# Patient Record
Sex: FEMALE | Race: WHITE | Employment: OTHER | URBAN - METROPOLITAN AREA
[De-identification: names, ages, dates, MRNs, and addresses within clinical notes are randomized per-mention and may not be internally consistent; named-entity substitution may affect disease eponyms.]

---

## 2017-01-01 ENCOUNTER — OFFICE VISIT (OUTPATIENT)
Dept: PULMONOLOGY | Facility: CLINIC | Age: 62
End: 2017-01-01
Attending: INTERNAL MEDICINE
Payer: MEDICARE

## 2017-01-01 ENCOUNTER — INFUSION THERAPY VISIT (OUTPATIENT)
Dept: INFUSION THERAPY | Facility: CLINIC | Age: 62
End: 2017-01-01
Attending: INTERNAL MEDICINE
Payer: MEDICARE

## 2017-01-01 ENCOUNTER — HOSPITAL ENCOUNTER (EMERGENCY)
Facility: CLINIC | Age: 62
Discharge: HOME | End: 2017-07-02
Attending: EMERGENCY MEDICINE | Admitting: EMERGENCY MEDICINE
Payer: MEDICARE

## 2017-01-01 ENCOUNTER — APPOINTMENT (OUTPATIENT)
Dept: GENERAL RADIOLOGY | Facility: CLINIC | Age: 62
End: 2017-01-01
Attending: EMERGENCY MEDICINE
Payer: MEDICARE

## 2017-01-01 ENCOUNTER — HOSPITAL ENCOUNTER (EMERGENCY)
Facility: CLINIC | Age: 62
Discharge: HOME | End: 2017-01-08
Attending: EMERGENCY MEDICINE | Admitting: EMERGENCY MEDICINE
Payer: MEDICARE

## 2017-01-01 ENCOUNTER — TELEPHONE (OUTPATIENT)
Dept: PULMONOLOGY | Facility: CLINIC | Age: 62
End: 2017-01-01

## 2017-01-01 ENCOUNTER — HOSPITAL ENCOUNTER (EMERGENCY)
Facility: CLINIC | Age: 62
Discharge: HOME | End: 2017-01-09
Attending: EMERGENCY MEDICINE | Admitting: EMERGENCY MEDICINE
Payer: MEDICARE

## 2017-01-01 VITALS
TEMPERATURE: 98.7 F | HEIGHT: 62 IN | DIASTOLIC BLOOD PRESSURE: 78 MMHG | RESPIRATION RATE: 18 BRPM | WEIGHT: 135 LBS | SYSTOLIC BLOOD PRESSURE: 147 MMHG | BODY MASS INDEX: 24.84 KG/M2 | HEART RATE: 77 BPM | OXYGEN SATURATION: 96 %

## 2017-01-01 VITALS
SYSTOLIC BLOOD PRESSURE: 150 MMHG | DIASTOLIC BLOOD PRESSURE: 74 MMHG | RESPIRATION RATE: 16 BRPM | HEART RATE: 70 BPM | TEMPERATURE: 98.3 F

## 2017-01-01 VITALS
BODY MASS INDEX: 24.8 KG/M2 | HEIGHT: 63 IN | WEIGHT: 140 LBS | TEMPERATURE: 97.4 F | DIASTOLIC BLOOD PRESSURE: 76 MMHG | OXYGEN SATURATION: 97 % | SYSTOLIC BLOOD PRESSURE: 154 MMHG

## 2017-01-01 VITALS
HEART RATE: 74 BPM | HEIGHT: 64 IN | BODY MASS INDEX: 23.9 KG/M2 | SYSTOLIC BLOOD PRESSURE: 133 MMHG | OXYGEN SATURATION: 94 % | TEMPERATURE: 98.3 F | WEIGHT: 140 LBS | RESPIRATION RATE: 20 BRPM | DIASTOLIC BLOOD PRESSURE: 96 MMHG

## 2017-01-01 VITALS
DIASTOLIC BLOOD PRESSURE: 62 MMHG | RESPIRATION RATE: 18 BRPM | TEMPERATURE: 98.2 F | HEART RATE: 68 BPM | SYSTOLIC BLOOD PRESSURE: 144 MMHG

## 2017-01-01 VITALS
SYSTOLIC BLOOD PRESSURE: 142 MMHG | HEART RATE: 74 BPM | DIASTOLIC BLOOD PRESSURE: 72 MMHG | TEMPERATURE: 98.2 F | RESPIRATION RATE: 14 BRPM

## 2017-01-01 VITALS
RESPIRATION RATE: 18 BRPM | HEART RATE: 81 BPM | SYSTOLIC BLOOD PRESSURE: 143 MMHG | DIASTOLIC BLOOD PRESSURE: 69 MMHG | TEMPERATURE: 98.6 F

## 2017-01-01 VITALS
RESPIRATION RATE: 16 BRPM | SYSTOLIC BLOOD PRESSURE: 122 MMHG | DIASTOLIC BLOOD PRESSURE: 60 MMHG | TEMPERATURE: 97.8 F | WEIGHT: 136 LBS | HEIGHT: 63 IN | HEART RATE: 69 BPM | OXYGEN SATURATION: 96 % | BODY MASS INDEX: 24.1 KG/M2

## 2017-01-01 VITALS
RESPIRATION RATE: 20 BRPM | SYSTOLIC BLOOD PRESSURE: 129 MMHG | HEART RATE: 67 BPM | DIASTOLIC BLOOD PRESSURE: 68 MMHG | TEMPERATURE: 97.5 F

## 2017-01-01 VITALS — DIASTOLIC BLOOD PRESSURE: 82 MMHG | RESPIRATION RATE: 16 BRPM | SYSTOLIC BLOOD PRESSURE: 150 MMHG | TEMPERATURE: 97.6 F

## 2017-01-01 DIAGNOSIS — J47.9 BRONCHIECTASIS WITHOUT COMPLICATION (H): ICD-10-CM

## 2017-01-01 DIAGNOSIS — R09.02 HYPOXEMIA: ICD-10-CM

## 2017-01-01 DIAGNOSIS — D80.1 HYPOGAMMAGLOBULINEMIA (H): Primary | ICD-10-CM

## 2017-01-01 DIAGNOSIS — G47.33 OSA (OBSTRUCTIVE SLEEP APNEA): ICD-10-CM
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
## 2017-01-01 DIAGNOSIS — S09.90XA CLOSED HEAD INJURY, INITIAL ENCOUNTER: ICD-10-CM

## 2017-01-01 DIAGNOSIS — J98.01 BRONCHOSPASM: ICD-10-CM

## 2017-01-01 DIAGNOSIS — J47.9 BRONCHIECTASIS WITHOUT ACUTE EXACERBATION (H): ICD-10-CM

## 2017-01-01 DIAGNOSIS — M62.81 GENERALIZED MUSCLE WEAKNESS: Primary | ICD-10-CM

## 2017-01-01 DIAGNOSIS — S01.01XA LACERATION OF SCALP, INITIAL ENCOUNTER: ICD-10-CM

## 2017-01-01 DIAGNOSIS — J47.9 BRONCHIECTASIS (H): ICD-10-CM

## 2017-01-01 DIAGNOSIS — J47.9 BRONCHIECTASIS WITHOUT COMPLICATION (H): Primary | ICD-10-CM

## 2017-01-01 DIAGNOSIS — S29.012A UPPER BACK STRAIN, INITIAL ENCOUNTER: ICD-10-CM

## 2017-01-01 LAB
BASE EXCESS BLDV CALC-SCNC: 5 MMOL/L
EXPTIME-PRE: 7.86 SEC
FEF2575-%PRED-PRE: 67 %
FEF2575-PRE: 1.37 L/SEC
FEF2575-PRED: 2.03 L/SEC
FEFMAX-%PRED-PRE: 68 %
FEFMAX-PRE: 3.94 L/SEC
FEFMAX-PRED: 5.8 L/SEC
FEV1-%PRED-PRE: 70 %
FEV1-PRE: 1.59 L
FEV1FEV6-PRE: 79 %
FEV1FEV6-PRED: 80 %
FEV1FVC-PRE: 80 %
FEV1FVC-PRED: 79 %
FIFMAX-PRE: 4.57 L/SEC
FVC-%PRED-PRE: 69 %
FVC-PRE: 1.98 L
FVC-PRED: 2.86 L
HCO3 BLDV-SCNC: 30 MMOL/L (ref 21–28)
O2/TOTAL GAS SETTING VFR VENT: ABNORMAL %
PCO2 BLDV: 45 MM HG (ref 40–50)
PH BLDV: 7.43 PH (ref 7.32–7.43)
PO2 BLDV: 45 MM HG (ref 25–47)

## 2017-01-01 PROCEDURE — 25000128 H RX IP 250 OP 636: Performed by: INTERNAL MEDICINE

## 2017-01-01 PROCEDURE — 25000132 ZZH RX MED GY IP 250 OP 250 PS 637: Mod: GY | Performed by: INTERNAL MEDICINE

## 2017-01-01 PROCEDURE — 99212 OFFICE O/P EST SF 10 MIN: CPT | Mod: ZF

## 2017-01-01 PROCEDURE — 96365 THER/PROPH/DIAG IV INF INIT: CPT

## 2017-01-01 PROCEDURE — A9270 NON-COVERED ITEM OR SERVICE: HCPCS | Mod: GY | Performed by: INTERNAL MEDICINE

## 2017-01-01 PROCEDURE — 99283 EMERGENCY DEPT VISIT LOW MDM: CPT

## 2017-01-01 PROCEDURE — 82803 BLOOD GASES ANY COMBINATION: CPT | Performed by: INTERNAL MEDICINE

## 2017-01-01 PROCEDURE — 99282 EMERGENCY DEPT VISIT SF MDM: CPT

## 2017-01-01 PROCEDURE — 72072 X-RAY EXAM THORAC SPINE 3VWS: CPT

## 2017-01-01 PROCEDURE — 36415 COLL VENOUS BLD VENIPUNCTURE: CPT | Performed by: INTERNAL MEDICINE

## 2017-01-01 PROCEDURE — 12002 RPR S/N/AX/GEN/TRNK2.6-7.5CM: CPT

## 2017-01-01 RX ORDER — DIPHENHYDRAMINE HCL 12.5MG/5ML
12.5 LIQUID (ML) ORAL EVERY 6 HOURS PRN
Status: CANCELLED
Start: 2017-01-01

## 2017-01-01 RX ORDER — DIPHENHYDRAMINE HCL 12.5 MG/5ML
12.5 SOLUTION ORAL EVERY 6 HOURS PRN
Status: DISCONTINUED | OUTPATIENT
Start: 2017-01-01 | End: 2017-01-01 | Stop reason: HOSPADM

## 2017-01-01 RX ORDER — DIPHENHYDRAMINE HCL 12.5MG/5ML
12.5 LIQUID (ML) ORAL EVERY 6 HOURS PRN
Status: DISCONTINUED | OUTPATIENT
Start: 2017-01-01 | End: 2017-01-01 | Stop reason: HOSPADM

## 2017-01-01 RX ORDER — HYDROCODONE BITARTRATE AND ACETAMINOPHEN 5; 325 MG/1; MG/1
1 TABLET ORAL EVERY 6 HOURS PRN
Qty: 6 TABLET | Refills: 0 | Status: SHIPPED | OUTPATIENT
Start: 2017-01-01

## 2017-01-01 RX ADMIN — DIPHENHYDRAMINE HYDROCHLORIDE 12.5 MG: 12.5 SOLUTION ORAL at 13:24

## 2017-01-01 RX ADMIN — DIPHENHYDRAMINE HYDROCHLORIDE 12.5 MG: 12.5 SOLUTION ORAL at 13:47

## 2017-01-01 RX ADMIN — SODIUM CHLORIDE 250 ML: 9 INJECTION, SOLUTION INTRAVENOUS at 14:02

## 2017-01-01 RX ADMIN — SODIUM CHLORIDE 250 ML: 9 INJECTION, SOLUTION INTRAVENOUS at 13:57

## 2017-01-01 RX ADMIN — HUMAN IMMUNOGLOBULIN G 10 G: 10 LIQUID INTRAVENOUS at 13:28

## 2017-01-01 RX ADMIN — DIPHENHYDRAMINE HYDROCHLORIDE 12.5 MG: 12.5 SOLUTION ORAL at 13:54

## 2017-01-01 RX ADMIN — HUMAN IMMUNOGLOBULIN G 10 G: 10 LIQUID INTRAVENOUS at 13:40

## 2017-01-01 RX ADMIN — IMMUNE GLOBULIN INFUSION (HUMAN) 10 G: 100 INJECTION, SOLUTION INTRAVENOUS; SUBCUTANEOUS at 13:50

## 2017-01-01 RX ADMIN — HUMAN IMMUNOGLOBULIN G 10 G: 10 LIQUID INTRAVENOUS at 13:26

## 2017-01-01 RX ADMIN — SODIUM CHLORIDE 250 ML: 9 INJECTION, SOLUTION INTRAVENOUS at 13:49

## 2017-01-01 RX ADMIN — SODIUM CHLORIDE 250 ML: 9 INJECTION, SOLUTION INTRAVENOUS at 13:31

## 2017-01-01 RX ADMIN — DIPHENHYDRAMINE HYDROCHLORIDE 12.5 MG: 12.5 SOLUTION ORAL at 13:51

## 2017-01-01 RX ADMIN — HUMAN IMMUNOGLOBULIN G 10 G: 10 LIQUID INTRAVENOUS at 13:57

## 2017-01-01 RX ADMIN — IMMUNE GLOBULIN INFUSION (HUMAN) 10 G: 100 INJECTION, SOLUTION INTRAVENOUS; SUBCUTANEOUS at 14:08

## 2017-01-01 RX ADMIN — DIPHENHYDRAMINE HYDROCHLORIDE 12.5 MG: 12.5 SOLUTION ORAL at 13:33

## 2017-01-01 RX ADMIN — DIPHENHYDRAMINE HYDROCHLORIDE 12.5 MG: 12.5 SOLUTION ORAL at 13:18

## 2017-01-01 ASSESSMENT — ENCOUNTER SYMPTOMS
ABDOMINAL PAIN: 0
BACK PAIN: 0
WOUND: 1
VOMITING: 0
NAUSEA: 0
VOMITING: 0
BACK PAIN: 1
NECK PAIN: 0
HEADACHES: 0
NECK PAIN: 0

## 2017-01-01 ASSESSMENT — PAIN SCALES - GENERAL
PAINLEVEL: NO PAIN (0)

## 2017-01-08 NOTE — DISCHARGE INSTRUCTIONS
Discharge Instructions  Laceration (Cut)    You were seen today for a laceration (cut).  Your doctor examined your laceration for any problems such a buried foreign body (like glass, a splinter, or gravel), or injury to blood vessels, tendons, and nerves.  Your doctor may have also rinsed and/or scrubbed your laceration to help prevent an infection.  Your laceration may have been closed with glue, staples or sutures (stitches).      It may not be possible to find all problems with your laceration on the first visit, and we can't always prevent infections.  Antibiotics are only given when the benefit is more than the risk, and don't prevent all infections. Some lacerations are too high risk to close, and are left open to heal.  All lacerations, no matter how expertly repaired, will cause scarring.    Return to the Emergency Department right away if:    You have more redness, swelling, pain, drainage (pus), a bad smell, or red streaking from your laceration.      You have a fever of 101oF or more.    You have bleeding that you can t stop at home. If your cut starts to bleed, hold pressure on the bleeding area with a clean cloth or put pressure over the bandage.  If the bleeding doesn t stop after using constant pressure for 30 minutes, you should return to the Emergency Department for further treatment.    An area past the laceration is cool, pale, or blue compared with the other side, or has a slower return of color when squeezed.    Your dressing seems too tight or starts to get uncomfortable or painful.    You have loss of normal function or use of an area, such as being unable to straighten or bend a finger normally.    You have a numb area past the laceration.    Return to the Emergency Department or see your regular doctor if:    The laceration starts to come open.     You have something coming out of the cut or a feeling that there is something in the laceration.    Your wound will not heal, or keeps breaking  "open. There can always be glass, wood, dirt or other things in any wound.  They won t always show up, even on x-rays.  If a wound doesn t heal, this may be why, and it is important to follow-up with your regular doctor.    Home Care:    Take your dressing off in 12 hours, or as instructed by your doctor, to check your laceration. Remove the dressing sooner if it seems too tight or painful, or if it is getting numb, tingly, or pale past the dressing.    Gently wash your laceration 2 times a day with clean cloth and soap.     It is okay to shower, but do not let the laceration soak in water.      If your laceration was closed with wound adhesive or strips: pat it dry and leave it open to the air.     For all other repairs: after you wash your laceration, or at least 2 times a day, apply bacitracin or other antibiotic ointment to the laceration, then cover it with a Band-Aid  or gauze.    Keep the laceration clean. Wear gloves or other protective clothing if you are around dirt.    Follow-up:    You need to follow-up with your regular doctor in 5 - 7 days.    Your sutures or staples need to be removed in 5 - 7 days. Schedule an appointment with your regular doctor to have this done.    Scars:  To help minimize scarring:    Wear sunscreen over the healed laceration when out in the sun.    Massage the area regularly.    You may use Vitamin E oil.    Wait a year.  Most scars will start to fade within a year.    Probiotics: If you have been given an antibiotic, you may want to also take a probiotic pill or eat yogurt with live cultures. Probiotics have \"good bacteria\" to help your intestines stay healthy. Studies have shown that probiotics help prevent diarrhea and other intestine problems (including C. diff infection) when you take antibiotics. You can buy these without a prescription in the pharmacy section of the store.     If you were given a prescription for medicine here today, be sure to read all of the information " (including the package insert) that comes with your prescription.  This will include important information about the medicine, its side effects, and any warnings that you need to know about.  The pharmacist who fills the prescription can provide more information and answer questions you may have about the medicine.  If you have questions or concerns that the pharmacist cannot address, please call or return to the Emergency Department.     Discharge Instructions  Head Injury    You have been seen today for a head injury. You were checked for serious problems, like bleeding on the brain, but these problems cannot always be found right away.  Due to this risk, you should not be alone for 24 hours after your injury.  Follow up with your regular physician in 2 - 3 days. If you are taking a blood thinner, such as aspirin, Pradaxa  (dabigatran), Coumadin  (warfarin), or Plavix  (clopidogrel), you are at especially high risk for immediate or delayed bleeding, and need to re-check with a physician in 24 hours, or sooner if any of the symptoms below happen.     Return to the Emergency Department if:    You are confused, have amnesia, or you are not acting right.    Your headache gets worse or you start to have a really bad headache even with your recommended treatment plan.    You vomit more than once.    You have a convulsion or seizure.    You have trouble walking.    You have weakness or paralysis in an arm or a leg.    You have blood or fluid coming from your ears or nose.    You have new symptoms or anything that worries you.    Sleeping:  It is okay for you to sleep, but someone should wake you up as instructed by your doctor, and someone should check on you at your usual time to wake up.     Activity:    Do not drive for at least 24 hours.    Do not drive if you have dizzy spells or trouble concentrating, or remembering things.    Do not return to any contact sports until cleared by your regular doctor.     Follow-up:   It is very important that you make an appointment with your clinic and go to the appointment.  If you do not follow-up with your regular doctor, it may result in missing an important development which could result in permanent injury or disability and/or lasting pain.  If there is any problem keeping your appointment, call your doctor or return to the Emergency Department.    MORE INFORMATION:    Concussion:  A concussion is a minor head injury that may cause temporary problems with the way your brain works.  Some symptoms include:  confusion, amnesia, nausea and vomiting, dizziness, fatigue, memory or concentration problems, irritability and sleep problems.    CT Scans: Your evaluation today may have included a CT scan (CAT scan) to look for things like bleeding or a skull fracture (break).  CT scans involve radiation and too many CT scans can cause serious health problems like cancer, especially in children.  Because of this, your doctor may not have ordered a CT scan today if they think you are at low risk for a serious or life threatening problem.    If you were given a prescription for medicine here today, be sure to read all of the information (including the package insert) that comes with your prescription.  This will include important information about the medicine, its side effects, and any warnings that you need to know about.  The pharmacist who fills the prescription can provide more information and answer questions you may have about the medicine.  If you have questions or concerns that the pharmacist cannot address, please call or return to the Emergency Department.

## 2017-01-08 NOTE — ED AVS SNAPSHOT
Emergency Department    [address] [place] [id number]    Phone:  [phone]    Fax:  [phone]                                       Tamra Aponte   MRN: [id number]    Department:   Emergency Department   Date of Visit:  1/8/2017           After Visit Summary Signature Page     I have received my discharge instructions, and my questions have been answered. I have discussed any challenges I see with this plan with the nurse or doctor.    ..........................................................................................................................................  Patient/Patient Representative Signature      ..........................................................................................................................................  Patient Representative Print Name and Relationship to Patient    ..................................................               ................................................  Date                                            Time    ..........................................................................................................................................  Reviewed by Signature/Title    ...................................................              ..............................................  Date                                                            Time

## 2017-01-08 NOTE — ED PROVIDER NOTES
History     Chief Complaint:  Fall      HPI   Tamra Aponte is a 61 year old female who presents with fall. The patient states that she has had issues with balance for a while now, and today she got up from sitting lost her balance and fell. She struck her head on the side of an end table, and has a laceration to that area. She denies any loss of consciousness, nausea, vomiting, neck pain, or back pain.     Allergies:  Amoxicillin, hives       Medications:    azithromycin (ZITHROMAX) 250 MG tablet  ESTRADIOL 0.1 MG/GM NON-ALCOHOL GEL  levalbuterol (XOPENEX HFA) 45 MCG/ACT inhaler  fluticasone-salmeterol (ADVAIR) 250-50 MCG/DOSE diskus inhaler  ciprofloxacin (CIPRO) 500 MG tablet  HYDROcodone-acetaminophen (NORCO) 5-325 MG per tablet  omeprazole (PRILOSEC) 20 MG capsule  LAMOTRIGINE PO  ipratropium (ATROVENT HFA) 17 MCG/ACT inhaler  calcitonin, salmon, (MIACALCIN) 200 UNIT/ACT nasal spray  NAPROXEN PO  triamcinolone (NASACORT) 55 MCG/ACT nasal inhaler  LORazepam (ATIVAN) 0.5 MG tablet  guaiFENesin (MUCINEX) 600 MG 12 hr tablet  divalproex (DEPAKOTE) 250 MG EC tablet  levothyroxine (SSYNTHROID,LEVOTHROID) 100 MCG tablet  FLUoxetine (PROZAC) 20 MG capsule     Past Medical History:    Seizures   Depression   Brain Injury   COPD   Hypogammaglobulinaemia  Thyroid Disease     Past Surgical History:    Hysterectomy   Brain Surgery   Colonoscopy   Laminectomy and Discectomy   Implant vagus nerve stimulator    Replace vagus nerve stimulator, generator     Family History:    No past pertinent family history.     Social History:  Tobacco: Negative  Alcohol: Positive, occasional   Marital Status:   [2]     Review of Systems   Gastrointestinal: Negative for nausea and vomiting.   Musculoskeletal: Negative for back pain and neck pain.   Skin: Positive for wound (laceration back of the head).   Neurological: Negative for syncope.   All other systems reviewed and are negative.      Physical Exam   First Vitals:  BP:  "164/77 mmHg  Pulse: 74  Temp: 98.3  F (36.8  C)  Resp: 20  Height: 161.5 cm (5' 3.6\")  Weight: 63.504 kg (140 lb)  SpO2: 96 %      Physical Exam  Eye:  Pupils are equal, round, and reactive.  Extraocular movements intact.    ENT:  No rhinorrhea.  Moist mucus membranes.  Normal tongue and tonsil.    Cardiac:  Regular rate and rhythm.  No murmurs, gallops, or rubs.    Pulmonary:  Clear to auscultation bilaterally.  No wheezes, rales, or rhonchi.    Abdomen:  Positive bowel sounds.  Abdomen is soft and non-distended, without focal tenderness.    Musculoskeletal:  Normal movement of all extremities without evidence for deficit. No midline tenderness to the neck or back.     Skin:  Warm and dry without rashes. Three are two lacerations to the right side of the head. There is a 2cm laceration over the occiput, deep to the dermis, mild gaping, no bleeding. There is a 1cm laceration to the parietal scalp, deep to the dermis, mild gaping, no bleeding.     CN II - XII intact.  5/5 strength in all extremities.  Normal sensation throughout.  Normal finger to nose and heel to shin.  2+ patellar reflexes.  Normal gait.     Psychiatric:  Normal affect with appropriate interaction with examiner.     Emergency Department Course     Procedures:    Narrative: Procedure: Laceration Repair #1       LACERATION:  A simple clean 2 cm laceration.      LOCATION:  Occiput Scalp       ANESTHESIA:  Local using 1% Lidocaine with Epi total of 1 mLs      PREPARATION:  Irrigation and Scrubbing with Normal Saline and Shur Clens      DEBRIDEMENT:  No debridement       CLOSURE:  Wound was closed with 4 Staples        Narrative: Procedure: Laceration Repair #2       LACERATION:  A simple clean 1 cm laceration.      LOCATION:  Parietal Scalp       ANESTHESIA:  Local using 1% Lidocaine with Epi total of 1 mLs      PREPARATION:  Irrigation and Scrubbing with Normal Saline and Shur Clens      DEBRIDEMENT:  No debridement      CLOSURE:  Wound was closed " with 2 Staples      Emergency Department Course:  Nursing notes and vitals reviewed.  I performed an exam of the patient as documented above.     1553 I rechecked the patient.     Findings and plan explained to the Patient. Patient discharged home with instructions regarding supportive care, medications, and reasons to return. The importance of close follow-up was reviewed.      Impression & Plan      Medical Decision Making:  Tamra Aponte is a 61 year old female with a history of frequent falls, who presents to us after a typical mechanical fall today when she got out of her chair. She struck the back of her head against a piece of furniture. Her exam here is completely unremarkable for trauma or intracranial process. She is alert and cooperative and I do not feel that a head CT is not indicated per the [facility] [facility] rules. Her lacerations were repaired as above. Plan will be for discharge to home with staple removal in 5-7 days. She was advised to return immediately for any severe headache, change in mental state, signs of infection, or any other emergent concerns.     Diagnosis:    ICD-10-CM    1. Closed head injury, initial encounter S09.90XA    2. Laceration of scalp, initial encounter S01.01XA        Disposition:  Discharged home.     I, Sienna Sampson, am serving as a scribe on 1/8/2017 at 2:53 PM to personally document services performed by Dr. Trierweiler based on my observations and the provider's statements to me.      Sienna Sampson  1/8/2017    EMERGENCY DEPARTMENT        Trierweiler, Chad A, MD  01/09/17 1840

## 2017-01-08 NOTE — ED AVS SNAPSHOT
Emergency Department    [address] [facility] [address]    Phone:  [phone]    Fax:  [phone]                                       Tamra Aponte   MRN: [id number]    Department:   Emergency Department   Date of Visit:  1/8/2017           Patient Information     Date Of Birth          1955        Your diagnoses for this visit were:     Closed head injury, initial encounter     Laceration of scalp, initial encounter        You were seen by Trierweiler, Chad A, MD.      Follow-up Information     Follow up with Edith Wen In 1 week.    Specialty:  Internal Medicine    Contact information:    [facility]  [address] [place] [address] [phone]          Follow up with  Emergency Department.    Specialty:  EMERGENCY MEDICINE    Why:  If symptoms worsen    Contact information:    [address] [facility] [id number] [phone]        Discharge Instructions       Discharge Instructions  Laceration (Cut)    You were seen today for a laceration (cut).  Your doctor examined your laceration for any problems such a buried foreign body (like glass, a splinter, or gravel), or injury to blood vessels, tendons, and nerves.  Your doctor may have also rinsed and/or scrubbed your laceration to help prevent an infection.  Your laceration may have been closed with glue, staples or sutures (stitches).      It may not be possible to find all problems with your laceration on the first visit, and we can't always prevent infections.  Antibiotics are only given when the benefit is more than the risk, and don't prevent all infections. Some lacerations are too high risk to close, and are left open to heal.  All lacerations, no matter how expertly repaired, will cause scarring.    Return to the Emergency Department right away if:    You have more redness, swelling, pain, drainage (pus), a bad smell, or red streaking from your laceration.      You have a fever of 101oF  or more.    You have bleeding that you can t stop at home. If your cut starts to bleed, hold pressure on the bleeding area with a clean cloth or put pressure over the bandage.  If the bleeding doesn t stop after using constant pressure for 30 minutes, you should return to the Emergency Department for further treatment.    An area past the laceration is cool, pale, or blue compared with the other side, or has a slower return of color when squeezed.    Your dressing seems too tight or starts to get uncomfortable or painful.    You have loss of normal function or use of an area, such as being unable to straighten or bend a finger normally.    You have a numb area past the laceration.    Return to the Emergency Department or see your regular doctor if:    The laceration starts to come open.     You have something coming out of the cut or a feeling that there is something in the laceration.    Your wound will not heal, or keeps breaking open. There can always be glass, wood, dirt or other things in any wound.  They won t always show up, even on x-rays.  If a wound doesn t heal, this may be why, and it is important to follow-up with your regular doctor.    Home Care:    Take your dressing off in 12 hours, or as instructed by your doctor, to check your laceration. Remove the dressing sooner if it seems too tight or painful, or if it is getting numb, tingly, or pale past the dressing.    Gently wash your laceration 2 times a day with clean cloth and soap.     It is okay to shower, but do not let the laceration soak in water.      If your laceration was closed with wound adhesive or strips: pat it dry and leave it open to the air.     For all other repairs: after you wash your laceration, or at least 2 times a day, apply bacitracin or other antibiotic ointment to the laceration, then cover it with a Band-Aid  or gauze.    Keep the laceration clean. Wear gloves or other protective clothing if you are around  "dirt.    Follow-up:    You need to follow-up with your regular doctor in 5 - 7 days.    Your sutures or staples need to be removed in 5 - 7 days. Schedule an appointment with your regular doctor to have this done.    Scars:  To help minimize scarring:    Wear sunscreen over the healed laceration when out in the sun.    Massage the area regularly.    You may use Vitamin E oil.    Wait a year.  Most scars will start to fade within a year.    Probiotics: If you have been given an antibiotic, you may want to also take a probiotic pill or eat yogurt with live cultures. Probiotics have \"good bacteria\" to help your intestines stay healthy. Studies have shown that probiotics help prevent diarrhea and other intestine problems (including C. diff infection) when you take antibiotics. You can buy these without a prescription in the pharmacy section of the store.     If you were given a prescription for medicine here today, be sure to read all of the information (including the package insert) that comes with your prescription.  This will include important information about the medicine, its side effects, and any warnings that you need to know about.  The pharmacist who fills the prescription can provide more information and answer questions you may have about the medicine.  If you have questions or concerns that the pharmacist cannot address, please call or return to the Emergency Department.     Discharge Instructions  Head Injury    You have been seen today for a head injury. You were checked for serious problems, like bleeding on the brain, but these problems cannot always be found right away.  Due to this risk, you should not be alone for 24 hours after your injury.  Follow up with your regular physician in 2 - 3 days. If you are taking a blood thinner, such as aspirin, Pradaxa  (dabigatran), Coumadin  (warfarin), or Plavix  (clopidogrel), you are at especially high risk for immediate or delayed bleeding, and need to " re-check with a physician in 24 hours, or sooner if any of the symptoms below happen.     Return to the Emergency Department if:    You are confused, have amnesia, or you are not acting right.    Your headache gets worse or you start to have a really bad headache even with your recommended treatment plan.    You vomit more than once.    You have a convulsion or seizure.    You have trouble walking.    You have weakness or paralysis in an arm or a leg.    You have blood or fluid coming from your ears or nose.    You have new symptoms or anything that worries you.    Sleeping:  It is okay for you to sleep, but someone should wake you up as instructed by your doctor, and someone should check on you at your usual time to wake up.     Activity:    Do not drive for at least 24 hours.    Do not drive if you have dizzy spells or trouble concentrating, or remembering things.    Do not return to any contact sports until cleared by your regular doctor.     Follow-up:  It is very important that you make an appointment with your clinic and go to the appointment.  If you do not follow-up with your regular doctor, it may result in missing an important development which could result in permanent injury or disability and/or lasting pain.  If there is any problem keeping your appointment, call your doctor or return to the Emergency Department.    MORE INFORMATION:    Concussion:  A concussion is a minor head injury that may cause temporary problems with the way your brain works.  Some symptoms include:  confusion, amnesia, nausea and vomiting, dizziness, fatigue, memory or concentration problems, irritability and sleep problems.    CT Scans: Your evaluation today may have included a CT scan (CAT scan) to look for things like bleeding or a skull fracture (break).  CT scans involve radiation and too many CT scans can cause serious health problems like cancer, especially in children.  Because of this, your doctor may not have ordered a  CT scan today if they think you are at low risk for a serious or life threatening problem.    If you were given a prescription for medicine here today, be sure to read all of the information (including the package insert) that comes with your prescription.  This will include important information about the medicine, its side effects, and any warnings that you need to know about.  The pharmacist who fills the prescription can provide more information and answer questions you may have about the medicine.  If you have questions or concerns that the pharmacist cannot address, please call or return to the Emergency Department.                 Future Appointments        Provider Department Dept Phone Center    1/11/2017 1:00 PM [facility] [phone] [facility]    2/1/2017 11:30 AM Pulmonary Function Lab [facility] Pulmonary Function Testing [phone] [facility]    2/1/2017 12:00 PM Brayden Avendaño MD [facility] [phone] [facility]      24 Hour Appointment Hotline       To make an appointment at any [facility], call [phone] ([phone]). If you don't have a family doctor or clinic, we will help you find one. [facility] are conveniently located to serve the needs of you and your family.             Review of your medicines      Our records show that you are taking the medicines listed below. If these are incorrect, please call your family doctor or clinic.        Dose / Directions Last dose taken    ascorbic acid 500 MG Tabs   Dose:  500 mg        Take 500 mg by mouth daily.   Refills:  0        ATIVAN 0.5 MG tablet   Dose:  0.5 mg   Generic drug:  LORazepam        Take 0.5 mg by mouth every 6 hours as needed   Refills:  0        azithromycin 250 MG tablet   Commonly known as:  ZITHROMAX   Dose:  250 mg   Quantity:  90 tablet        Take 1 tablet (250 mg) by mouth daily   Refills:  3        calcitonin (salmon) 200 UNIT/ACT  nasal spray   Commonly known as:  MIACALCIN   Dose:  1 spray        Spray 1 spray into one nostril alternating nostrils daily Alternate nostril each day.   Refills:  0        calcium carbonate 500 MG tablet   Commonly known as:  OS-YULIET 500 mg [place]. Ca   Dose:  1000 mg        Take 1,000 mg by mouth daily   Refills:  0        ciprofloxacin 500 MG tablet   Commonly known as:  CIPRO   Quantity:  20 tablet        TAKE 1 TABLET (500 MG) BY MOUTH 2 TIMES DAILY AS NEEDED   Refills:  3        divalproex 250 MG EC tablet   Commonly known as:  DEPAKOTE   Dose:  250 mg        Take 250 mg by mouth 3 times daily.   Refills:  0        ESTRADIOL 0.1 MG/GM NON-ALCOHOL GEL        Insert 1 gram vaginally 2-3 times per week   Refills:  0        FLUoxetine 20 MG capsule   Commonly known as:  PROzac   Dose:  40 mg        Take 40 mg by mouth At Bedtime   Refills:  0        fluticasone-salmeterol 250-50 MCG/DOSE diskus inhaler   Commonly known as:  ADVAIR   Dose:  1 puff   Quantity:  60 Inhaler        Inhale 1 puff into the lungs 2 times daily   Refills:  7        guaiFENesin 600 MG 12 hr tablet   Commonly known as:  MUCINEX   Dose:  300 mg        Take 300 mg by mouth 4 times daily.   Refills:  0        HYDROcodone-acetaminophen 5-325 MG per tablet   Commonly known as:  NORCO   Dose:  1-2 tablet   Quantity:  15 tablet        Take 1-2 tablets by mouth every 4 hours as needed for moderate to severe pain   Refills:  0        ipratropium 17 MCG/ACT Inhaler   Commonly known as:  ATROVENT HFA   Dose:  2 puff   Quantity:  1 Inhaler        Inhale 2 puffs into the lungs every 6 hours   Refills:  3        LAMOTRIGINE PO   Dose:  250 mg        Take 250 mg by mouth 3 times daily 100mg in am, 50mg mid day, 100mg in pm   Refills:  0        levalbuterol 45 MCG/ACT Inhaler   Commonly known as:  XOPENEX HFA   Dose:  1 puff   Quantity:  1 Inhaler        Inhale 1 puff into the lungs every 6 hours as needed for shortness of breath / dyspnea or wheezing    Refills:  11        levothyroxine 100 MCG tablet   Commonly known as:  SYNTHROID/LEVOTHROID   Dose:  100 mcg        Take 100 mcg by mouth daily.   Refills:  0        Multi-vitamin Tabs tablet   Dose:  1 tablet        Take 1 tablet by mouth daily.   Refills:  0        NAPROXEN PO   Dose:  250 mg        Take 250 mg by mouth as needed for moderate pain   Refills:  0        omeprazole 20 MG CR capsule   Commonly known as:  priLOSEC   Quantity:  60 capsule        TAKE 1 CAPSULE (20 MG) BY MOUTH 2 TIMES DAILY   Refills:  9        triamcinolone 55 MCG/ACT nasal inhaler   Commonly known as:  NASACORT   Dose:  2 spray        Spray 2 sprays into both nostrils as needed   Refills:  0                Orders Needing Specimen Collection     None      Pending Results     No orders found from 1/7/2017 to 1/9/2017.            Pending Culture Results     No orders found from 1/7/2017 to 1/9/2017.             Test Results from your hospital stay            Clinical Quality Measure: Blood Pressure Screening     Your blood pressure was checked while you were in the emergency department today. The last reading we obtained was  BP: (!) 160/97 mmHg . Please read the guidelines below about what these numbers mean and what you should do about them.  If your systolic blood pressure (the top number) is less than 120 and your diastolic blood pressure (the bottom number) is less than 80, then your blood pressure is normal. There is nothing more that you need to do about it.  If your systolic blood pressure (the top number) is 120-139 or your diastolic blood pressure (the bottom number) is 80-89, your blood pressure may be higher than it should be. You should have your blood pressure rechecked within a year by a primary care provider.  If your systolic blood pressure (the top number) is 140 or greater or your diastolic blood pressure (the bottom number) is 90 or greater, you may have high blood pressure. High blood pressure is treatable, but if  "left untreated over time it can put you at risk for heart attack, stroke, or kidney failure. You should have your blood pressure rechecked by a primary care provider within the next 4 weeks.  If your provider in the emergency department today gave you specific instructions to follow-up with your doctor or provider even sooner than that, you should follow that instruction and not wait for up to 4 weeks for your follow-up visit.        Thank you for choosing [facility]       Thank you for choosing [facility] for your care. Our goal is always to provide you with excellent care. Hearing back from our patients is one way we can continue to improve our services. Please take a few minutes to complete the written survey that you may receive in the mail after you visit with us. Thank you!        [employer] Information     [employer] lets you send messages to your doctor, view your test results, renew your prescriptions, schedule appointments and more. To sign up, go to [email or web address] . Click on \"Log in\" on the left side of the screen, which will take you to the Welcome page. Then click on \"Sign up Now\" on the right side of the page.     You will be asked to enter the access code listed below, as well as some personal information. Please follow the directions to create your username and password.     Your access code is: [email or web address]  Expires: 2017  3:39 PM     Your access code will  in 90 days. If you need help or a new code, please call your [facility] clinic or [phone].        Care EveryWhere ID     This is your Care EveryWhere ID. This could be used by other organizations to access your [facility] medical records  [id number]        After Visit Summary       This is your record. Keep this with you and show to your community pharmacist(s) and doctor(s) at your next visit.                  "

## 2017-01-09 NOTE — ED AVS SNAPSHOT
Emergency Department    [address] [place] [address]    Phone:  [phone]    Fax:  [phone]                                       Tamra Aponte   MRN: [id number]    Department:   Emergency Department   Date of Visit:  1/9/2017           After Visit Summary Signature Page     I have received my discharge instructions, and my questions have been answered. I have discussed any challenges I see with this plan with the nurse or doctor.    ..........................................................................................................................................  Patient/Patient Representative Signature      ..........................................................................................................................................  Patient Representative Print Name and Relationship to Patient    ..................................................               ................................................  Date                                            Time    ..........................................................................................................................................  Reviewed by Signature/Title    ...................................................              ..............................................  Date                                                            Time
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
## 2017-01-09 NOTE — ED AVS SNAPSHOT
Emergency Department    [address] [facility] [address]    Phone:  [phone]    Fax:  [phone]                                       Tamra Aponte   MRN: [id number]    Department:   Emergency Department   Date of Visit:  1/9/2017           Patient Information     Date Of Birth          1955        Your diagnoses for this visit were:     Upper back strain, initial encounter        You were seen by Nasim Christian MD.      Follow-up Information     Follow up with Edith Wen In 1 day.    Specialty:  Internal Medicine    Why:  If symptoms worsen    Contact information:    [facility]  [address] [place] [address]  [phone]          Discharge Instructions         Back Sprain or Strain    Injury to the muscles (strain) or ligaments (sprain) around the spine can be troubling. Injury may occur after a sudden forceful twisting or bending force such as in a car accident, after a simple awkward movement, or after lifting something heavy with poor body positioning. In any case, muscle spasm is often present and adds to the pain.  Thankfully, most people feel better in 1 to 2 weeks, and most of the rest in 1 to 2 months. Most people can remain active. Unless you had a forceful physical injury such as a car accident or fall, X-rays are usually not ordered for the first evaluation of a back sprain or strain. If pain continues and does not respond to medical treatment, your healthcare provider may order X-rays and other tests.  Home care  The following guidelines will help you care for your injury at home:    When in bed, try to find a comfortable position. A firm mattress is best. Try lying flat on your back with pillows under your knees. You can also try lying on your side with your knees bent up toward your chest and a pillow between your knees.    Don't sit for long periods. Try not to take long car rides or take other trips that have you sitting for a long time. This  puts more stress on the lower back than standing or walking.    During the first 24 to 72 hours after an injury or flare-up, apply an ice pack to the painful area for 20 minutes. Then remove it for 20 minutes. Do this for 60 to 90 minutes, or several times a day, This will reduce swelling and pain. Be sure to wrap the ice pack in a thin towel or plastic to protect your skin.    You can start with ice, then switch to heat. Heat from a hot shower, hot bath, or heating pad reduces swelling and pain and works well for muscle spasms. Put heat on the painful area for 20 minutes, then remove for 20 minutes. Do this for 60 to 90 minutes, or several times a day. Do not use a heating pad while sleeping. It can burn the skin.    You can alternate the ice and heat. Talk with your healthcare provider to find out the best treatment or therapy for your back pain.    Therapeutic massage will help relax the back muscles without stretching them.    Be aware of safe lifting methods. Do not lift anything over 15 pounds until all of the pain is gone.  Medicines  Talk to your healthcare provider before using medicines, especially if you have other health problems or are taking other medicines.    You may use acetaminophen or ibuprofen to control pain, unless another pain medicine was prescribed. If you have chronic conditions like diabetes, liver or kidney disease, stomach ulcers, or gastrointestinal bleeding, or are taking blood-thinner medicines, talk with your doctor before taking any medicines.    Be careful if you are given prescription medicines, narcotics, or medicine for muscle spasm. They can cause drowsiness, and affect your coordination, reflexes, and judgment. Do not drive or operate heavy machinery.  Follow-up care  Follow up with your healthcare provider or this facility as advised. You may need physical therapy or more tests if your symptoms get worse.  If you had X-rays today, they didn t show any broken bones, breaks, or  fractures. Sometimes fractures don t show up on the first X-ray. Bruises and sprains can sometimes hurt as much as a fracture. These injuries can take time to heal completely. If your symptoms don t improve or they get worse, talk with your healthcare provider. You may need a repeat X-ray.  Call 911  Call for emergency care if any of the following occur:    Trouble breathing    Confused    Very drowsy or trouble awakening    Fainting or loss of consciousness    Rapid or very slow heart rate    Loss of bowel or bladder control  When to seek medical advice  Call your healthcare provider right away if any of the following occur:    Pain gets worse or spreads to your arms or legs    Weakness or numbness in one or both arms or legs    Numbness in the groin or genital area    3964-3343 [employer]. [address] [place] [address]. All rights reserved. This information is not intended as a substitute for professional medical care. Always follow your healthcare professional's instructions.          Future Appointments        Provider Department Dept Phone Center    1/11/2017 1:00 PM [facility] chair 1 [facility] [phone] [facility]    2/1/2017 11:30 AM Pulmonary Function Lab [facility] Pulmonary Function Testing [phone] [facility]    2/1/2017 12:00 PM Brayden Avendaño MD [facility] [phone] [facility]      24 Hour Appointment Hotline       To make an appointment at any [facility], call [phone] ([phone]). If you don't have a family doctor or clinic, we will help you find one. [facility] are conveniently located to serve the needs of you and your family.             Review of your medicines      Our records show that you are taking the medicines listed below. If these are incorrect, please call your family doctor or clinic.        Dose / Directions Last dose taken    ascorbic acid 500 MG Tabs   Dose:  500 mg         Take 500 mg by mouth daily.   Refills:  0        ATIVAN 0.5 MG tablet   Dose:  0.5 mg   Generic drug:  LORazepam        Take 0.5 mg by mouth every 6 hours as needed   Refills:  0        azithromycin 250 MG tablet   Commonly known as:  ZITHROMAX   Dose:  250 mg   Quantity:  90 tablet        Take 1 tablet (250 mg) by mouth daily   Refills:  3        calcitonin (salmon) 200 UNIT/ACT nasal spray   Commonly known as:  MIACALCIN   Dose:  1 spray        Spray 1 spray into one nostril alternating nostrils daily Alternate nostril each day.   Refills:  0        calcium carbonate 500 MG tablet   Commonly known as:  OS-YULIET 500 mg [place]. [place]   Dose:  1000 mg        Take 1,000 mg by mouth daily   Refills:  0        ciprofloxacin 500 MG tablet   Commonly known as:  CIPRO   Quantity:  20 tablet        TAKE 1 TABLET (500 MG) BY MOUTH 2 TIMES DAILY AS NEEDED   Refills:  3        divalproex 250 MG EC tablet   Commonly known as:  DEPAKOTE   Dose:  250 mg        Take 250 mg by mouth 3 times daily.   Refills:  0        ESTRADIOL 0.1 MG/GM NON-ALCOHOL GEL        Insert 1 gram vaginally 2-3 times per week   Refills:  0        FLUoxetine 20 MG capsule   Commonly known as:  PROzac   Dose:  40 mg        Take 40 mg by mouth At Bedtime   Refills:  0        fluticasone-salmeterol 250-50 MCG/DOSE diskus inhaler   Commonly known as:  ADVAIR   Dose:  1 puff   Quantity:  60 Inhaler        Inhale 1 puff into the lungs 2 times daily   Refills:  7        guaiFENesin 600 MG 12 hr tablet   Commonly known as:  MUCINEX   Dose:  300 mg        Take 300 mg by mouth 4 times daily.   Refills:  0        HYDROcodone-acetaminophen 5-325 MG per tablet   Commonly known as:  NORCO   Dose:  1-2 tablet   Quantity:  15 tablet        Take 1-2 tablets by mouth every 4 hours as needed for moderate to severe pain   Refills:  0        ipratropium 17 MCG/ACT Inhaler   Commonly known as:  ATROVENT HFA   Dose:  2 puff   Quantity:  1 Inhaler        Inhale 2 puffs into the lungs  every 6 hours   Refills:  3        LAMOTRIGINE PO   Dose:  250 mg        Take 250 mg by mouth 3 times daily 100mg in am, 50mg mid day, 100mg in pm   Refills:  0        levalbuterol 45 MCG/ACT Inhaler   Commonly known as:  XOPENEX HFA   Dose:  1 puff   Quantity:  1 Inhaler        Inhale 1 puff into the lungs every 6 hours as needed for shortness of breath / dyspnea or wheezing   Refills:  11        levothyroxine 100 MCG tablet   Commonly known as:  SYNTHROID/LEVOTHROID   Dose:  100 mcg        Take 100 mcg by mouth daily.   Refills:  0        Multi-vitamin Tabs tablet   Dose:  1 tablet        Take 1 tablet by mouth daily.   Refills:  0        NAPROXEN PO   Dose:  250 mg        Take 250 mg by mouth as needed for moderate pain   Refills:  0        omeprazole 20 MG CR capsule   Commonly known as:  priLOSEC   Quantity:  60 capsule        TAKE 1 CAPSULE (20 MG) BY MOUTH 2 TIMES DAILY   Refills:  9        triamcinolone 55 MCG/ACT nasal inhaler   Commonly known as:  NASACORT   Dose:  2 spray        Spray 2 sprays into both nostrils as needed   Refills:  0                Procedures and tests performed during your visit     Thoracic spine XR, 3 views      Orders Needing Specimen Collection     None      Pending Results     No orders found from 1/8/2017 to 1/10/2017.            Pending Culture Results     No orders found from 1/8/2017 to 1/10/2017.       Test Results from your hospital stay           1/9/2017  3:14 AM - Interface, Radiant Ib      Narrative     THORACIC SPINE 3 VIEWS  1/9/2017 3:04 AM     HISTORY: Fall.    COMPARISON: None.        Impression     IMPRESSION:   1. No visualized acute fracture of the thoracic spine.  2. Moderate gradual kyphosis of the thoracic spine. Otherwise, normal  alignment of the thoracic spine.  3. Mild degenerative changes throughout the thoracic spine.    VIOLETA RODRIGUEZ MD                Clinical Quality Measure: Blood Pressure Screening     Your blood pressure was checked while you were  "in the emergency department today. The last reading we obtained was    . Please read the guidelines below about what these numbers mean and what you should do about them.  If your systolic blood pressure (the top number) is less than 120 and your diastolic blood pressure (the bottom number) is less than 80, then your blood pressure is normal. There is nothing more that you need to do about it.  If your systolic blood pressure (the top number) is 120-139 or your diastolic blood pressure (the bottom number) is 80-89, your blood pressure may be higher than it should be. You should have your blood pressure rechecked within a year by a primary care provider.  If your systolic blood pressure (the top number) is 140 or greater or your diastolic blood pressure (the bottom number) is 90 or greater, you may have high blood pressure. High blood pressure is treatable, but if left untreated over time it can put you at risk for heart attack, stroke, or kidney failure. You should have your blood pressure rechecked by a primary care provider within the next 4 weeks.  If your provider in the emergency department today gave you specific instructions to follow-up with your doctor or provider even sooner than that, you should follow that instruction and not wait for up to 4 weeks for your follow-up visit.        Thank you for choosing [facility]       Thank you for choosing [facility] for your care. Our goal is always to provide you with excellent care. Hearing back from our patients is one way we can continue to improve our services. Please take a few minutes to complete the written survey that you may receive in the mail after you visit with us. Thank you!        [employer] Information     [employer] lets you send messages to your doctor, view your test results, renew your prescriptions, schedule appointments and more. To sign up, go to [email or web address] . Click on \"Log in\" on the left side of the screen, which will take you to the " "Welcome page. Then click on \"Sign up Now\" on the right side of the page.     You will be asked to enter the access code listed below, as well as some personal information. Please follow the directions to create your username and password.     Your access code is: [email or web address]  Expires: 2017  3:39 PM     Your access code will  in 90 days. If you need help or a new code, please call your [facility] clinic or [phone].        Care EveryWhere ID     This is your Care EveryWhere ID. This could be used by other organizations to access your [facility] medical records  [id number]        After Visit Summary       This is your record. Keep this with you and show to your community pharmacist(s) and doctor(s) at your next visit.                  "

## 2017-01-09 NOTE — DISCHARGE INSTRUCTIONS

## 2017-01-11 NOTE — PROGRESS NOTES
"Infusion Nursing Note:  Tamra Aponte presents today for IVIG.    Patient seen by provider today: No    Note: pt feeling weaker and more tired.     Intravenous Access:  Peripheral IV placed.    Treatment Conditions:  Rheumatology Infusion Checklist PRIOR TO INFUSION OF BIOLOGICAL MEDICATIONS OR ANY OF THESE AS LISTED: Immune Globulin (IVIG) \".rheumbiologicalchecklist\"    Prior to Infusion of biological medications or any of these as listed:    1. Elevated temperature, fever, chills, productive cough or abnormal vital signs, night sweats, coughing up blood or sputum, no appetite or abnormal vital signs : NO    2. Open wounds or new incisions: NO    3. Recent hospitalization: NO    4.  Recent surgeries:  NO    5. Any upcoming surgeries or dental procedures?:NO    6. Any current or recent bouts of illness or infection? On any antibiotics? : NO    7. Any new, sudden or worsening abdominal pain :NO    8. Vaccination within 4 weeks? Patient or someone in the household is scheduled to receive vaccination? No live virus vaccines prior to or during treatment :NO    9. Any nervous system diseases [i.e. multiple sclerosis, Guillain-McIntire, seizures, neurological  changes]: NO    10. Pregnant or breast feeding; or plans on pregnancy in the future: NO    11. Signs of worsening depression or suicidal ideations while taking benlysta:NO    12. New-onset medical symptoms: NO    13.  New cancer diagnosis or on chemotherapy or radiation NO    14.  Evaluate for any sign of active TB [Unexplained weight loss, Loss of appetite, Night sweats, Fever, Fatigue, Chills, Coughing for 3 weeks or longer, Hemoptysis (coughing up blood), Chest pain]: NO    **Note: If answered yes to any of the above, hold the infusion and contact ordering rheumatologist or on-call rheumatologist.   .      Post Infusion Assessment:  Patient tolerated infusion without incident.  Blood return noted pre and post infusion.  Site patent and intact, free from redness, " edema or discomfort.  No evidence of extravasations.  Access discontinued per protocol.    Discharge Plan:   Discharge instructions reviewed with: Patient and Family.  Patient and/or family verbalized understanding of discharge instructions and all questions answered.  Copy of AVS reviewed with patient and/or family.  Patient will return 2/9/2017 for next appointment.  Patient discharged in stable condition accompanied by: self and .  Departure Mode: Wheelchair.    Vanita Da Silva RN

## 2017-02-01 PROBLEM — R94.2 RESTRICTIVE PATTERN PRESENT ON PULMONARY FUNCTION TESTING: Status: ACTIVE | Noted: 2017-01-01

## 2017-02-01 NOTE — NURSING NOTE
Chief Complaint   Patient presents with     Bronchiectasis     Follow up on Tamra and her Bronchiectisis     Nilesh Esquivel CMA at 12:55 PM on 2/1/2017
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
## 2017-02-01 NOTE — MR AVS SNAPSHOT
After Visit Summary   2/1/2017    Tamra Aponte    MRN: [id number]           Patient Information     Date Of Birth          1955        Visit Information        Provider Department      2/1/2017 1:00 PM Brayden Avendaño MD [facility]        Today's Diagnoses     Generalized muscle weakness    -  1     RICHA (obstructive sleep apnea)         Hypoxemia            Follow-ups after your visit        Follow-up notes from your care team     Return in about 6 months (around 8/1/2017).      Your next 10 appointments already scheduled     Feb 09, 2017  1:00 PM   Level 4 with [facility] 3   [facility]    [facility]  [address]  [place] [address] [phone]              Future tests that were ordered for you today     Open Future Orders        Priority Expected Expires Ordered    Overnight oximetry study Routine 2/2/2017 3/3/2017 2/1/2017            Who to contact     If you have questions or need follow up information about today's clinic visit or your schedule please contact [facility] directly at [phone].  Normal or non-critical lab and imaging results will be communicated to you by MyChart, letter or phone within 4 business days after the clinic has received the results. If you do not hear from us within 7 days, please contact the clinic through [employer] or phone. If you have a critical or abnormal lab result, we will notify you by phone as soon as possible.  Submit refill requests through [employer] or call your pharmacy and they will forward the refill request to us. Please allow 3 business days for your refill to be completed.          Additional Information About Your Visit        MyChart Information     [employer] lets you send messages to your doctor, view your test results, renew your prescriptions, schedule appointments and  "more. To sign up, go to [email or web address] . Click on \"Log in\" on the left side of the screen, which will take you to the Welcome page. Then click on \"Sign up Now\" on the right side of the page.     You will be asked to enter the access code listed below, as well as some personal information. Please follow the directions to create your username and password.     Your access code is: [email or web address]  Expires: 2017  3:39 PM     Your access code will  in 90 days. If you need help or a new code, please call your [place] clinic or [phone].        Care EveryWhere ID     This is your Care EveryWhere ID. This could be used by other organizations to access your [place] medical records  [id number]        Your Vitals Were     Pulse Temperature Respirations Height BMI (Body Mass Index) Pulse Oximetry    77 98.7  F (37.1  C) (Oral) 18 1.58 m (5' 2.2\") 24.53 kg/m2 96%       Blood Pressure from Last 3 Encounters:   17 147/78   17 142/72   17 154/76    Weight from Last 3 Encounters:   17 61.236 kg (135 lb)   17 63.504 kg (140 lb)   17 63.504 kg (140 lb)              We Performed the Following     Blood gas venous        Primary Care Provider Office Phone # Fax #    Edith CARLSON Wen [phone]       [address] [address]        Thank you!     Thank you for choosing [facility]  for your care. Our goal is always to provide you with excellent care. Hearing back from our patients is one way we can continue to improve our services. Please take a few minutes to complete the written survey that you may receive in the mail after your visit with us. Thank you!             Your Updated Medication List - Protect others around you: Learn how to safely use, store and throw away your medicines at www.disposemymeds.org.          This list is accurate as of: 17  2:36 PM.  Always use your most recent med list.    "                Brand Name Dispense Instructions for use    ascorbic acid 500 MG Tabs      Take 500 mg by mouth daily.       ATIVAN 0.5 MG tablet   Generic drug:  LORazepam      Take 0.5 mg by mouth every 6 hours as needed       azithromycin 250 MG tablet    ZITHROMAX    90 tablet    Take 1 tablet (250 mg) by mouth daily       calcitonin (salmon) 200 UNIT/ACT nasal spray    MIACALCIN     Spray 1 spray into one nostril alternating nostrils daily Alternate nostril each day.       calcium carbonate 500 MG tablet    OS-YULIET 500 mg [place]. [place]     Take 1,000 mg by mouth daily       ciprofloxacin 500 MG tablet    CIPRO    20 tablet    TAKE 1 TABLET (500 MG) BY MOUTH 2 TIMES DAILY AS NEEDED       divalproex 250 MG EC tablet    DEPAKOTE     Take 250 mg by mouth 3 times daily.       ESTRADIOL 0.1 MG/GM NON-ALCOHOL GEL      Insert 1 gram vaginally 2-3 times per week       FLUoxetine 20 MG capsule    PROzac     Take 40 mg by mouth At Bedtime       fluticasone-salmeterol 250-50 MCG/DOSE diskus inhaler    ADVAIR    60 Inhaler    Inhale 1 puff into the lungs 2 times daily       guaiFENesin 600 MG 12 hr tablet    MUCINEX     Take 300 mg by mouth 4 times daily.       HYDROcodone-acetaminophen 5-325 MG per tablet    NORCO    6 tablet    Take 1 tablet by mouth every 6 hours as needed for moderate to severe pain       ipratropium 17 MCG/ACT Inhaler    ATROVENT HFA    1 Inhaler    Inhale 2 puffs into the lungs every 6 hours       LAMOTRIGINE PO      Take 250 mg by mouth 3 times daily 100mg in am, 50mg mid day, 100mg in pm       levalbuterol 45 MCG/ACT Inhaler    XOPENEX HFA    1 Inhaler    Inhale 1 puff into the lungs every 6 hours as needed for shortness of breath / dyspnea or wheezing       levothyroxine 100 MCG tablet    SYNTHROID/LEVOTHROID     Take 100 mcg by mouth daily.       Multi-vitamin Tabs tablet      Take 1 tablet by mouth daily.       NAPROXEN PO      Take 250 mg by mouth as needed for moderate pain       omeprazole 20 MG CR  capsule    priLOSEC    60 capsule    TAKE 1 CAPSULE (20 MG) BY MOUTH 2 TIMES DAILY       triamcinolone 55 MCG/ACT nasal inhaler    NASACORT     Spray 2 sprays into both nostrils as needed

## 2017-02-01 NOTE — TELEPHONE ENCOUNTER
Pt called to see if appointments would work at 10:40 or 1:00 with Dr. Avendaño. Vague HIPPA compliant message left.  Marsha Wen  CMA at 9:13 AM on 2/1/2017

## 2017-03-09 NOTE — PROGRESS NOTES
Infusion Nursing Note:  Tamra Aponte presents today for IVIG.    Patient seen by provider today: No   present during visit today: Not Applicable.    Note: N/A.    Intravenous Access:  Peripheral IV placed.    Treatment Conditions:  Not Applicable.      Post Infusion Assessment:  Patient tolerated infusion without incident.  Site patent and intact, free from redness, edema or discomfort.  No evidence of extravasations.  Access discontinued per protocol.    Discharge Plan:   Discharge instructions reviewed with: Patient.  Patient and/or family verbalized understanding of discharge instructions and all questions answered.  Copy of AVS reviewed with patient and/or family.  Patient will return in 4 weeks for next appointment.  Patient discharged in stable condition accompanied by: .  Departure Mode: Wheelchair.    Eden Walton RN

## 2017-03-09 NOTE — MR AVS SNAPSHOT
After Visit Summary   3/9/2017    Tamra Aponte    MRN: [id number]           Patient Information     Date Of Birth          1955        Visit Information        Provider Department      3/9/2017 1:00 PM  INFUSION CHAIR 15 [facility]        Today's Diagnoses     Hypogammaglobulinemia (H)    -  1    Bronchiectasis without acute exacerbation (H)           Follow-ups after your visit        Your next 10 appointments already scheduled     Apr 06, 2017  1:00 PM CDT   Level 4 with  INFUSION CHAIR 6   [facility] ([facility])    [facility]  [address]  [facility] [id number] [phone]            Aug 02, 2017 12:00 PM CDT   (Arrive by 11:45 AM)   Return Lung Transplant with Brayden Avendaño MD   [facility] ([facility])    [address] [address] [phone]              Who to contact     If you have questions or need follow up information about today's clinic visit or your schedule please contact [facility] directly at [phone].  Normal or non-critical lab and imaging results will be communicated to you by MyChart, letter or phone within 4 business days after the clinic has received the results. If you do not hear from us within 7 days, please contact the clinic through [employer] or phone. If you have a critical or abnormal lab result, we will notify you by phone as soon as possible.  Submit refill requests through [employer] or call your pharmacy and they will forward the refill request to us. Please allow 3 business days for your refill to be completed.          Additional Information About Your Visit        [employer] Information     [employer] lets you send messages to your doctor, view your test results, renew your prescriptions, schedule appointments  "and more. To sign up, go to [email or web address] . Click on \"Log in\" on the left side of the screen, which will take you to the Welcome page. Then click on \"Sign up Now\" on the right side of the page.     You will be asked to enter the access code listed below, as well as some personal information. Please follow the directions to create your username and password.     Your access code is: [email or web address]  Expires: 2017  3:39 PM     Your access code will  in 90 days. If you need help or a new code, please call your [place] clinic or [phone].        Care EveryWhere ID     This is your Care EveryWhere ID. This could be used by other organizations to access your [place] medical records  [id number]        Your Vitals Were     Pulse Temperature Respirations             81 98.6  F (37  C) (Oral) 18          Blood Pressure from Last 3 Encounters:   17 143/69   17 144/62   17 147/78    Weight from Last 3 Encounters:   17 61.2 kg (135 lb)   17 63.5 kg (140 lb)   17 63.5 kg (140 lb)              We Performed the Following     Treatment Conditions     Treatment Conditions        Primary Care Provider Office Phone # Fax #    Edith Wen [phone]       [facility] [address] [place] [id number]        Thank you!     Thank you for choosing [facility]  for your care. Our goal is always to provide you with excellent care. Hearing back from our patients is one way we can continue to improve our services. Please take a few minutes to complete the written survey that you may receive in the mail after your visit with us. Thank you!             Your Updated Medication List - Protect others around you: Learn how to safely use, store and throw away your medicines at www.disposemymeds.org.          This list is accurate as of: 3/9/17  3:08 PM.  Always use your most recent med list.                   Brand Name Dispense " Instructions for use    ascorbic acid 500 MG Tabs      Take 500 mg by mouth daily.       ATIVAN 0.5 MG tablet   Generic drug:  LORazepam      Take 0.5 mg by mouth every 6 hours as needed       azithromycin 250 MG tablet    ZITHROMAX    90 tablet    Take 1 tablet (250 mg) by mouth daily       calcitonin (salmon) 200 UNIT/ACT nasal spray    MIACALCIN     Spray 1 spray into one nostril alternating nostrils daily Alternate nostril each day.       calcium carbonate 500 MG tablet    OS-YULIET 500 mg [place]. [place]     Take 1,000 mg by mouth daily       ciprofloxacin 500 MG tablet    CIPRO    20 tablet    TAKE 1 TABLET (500 MG) BY MOUTH 2 TIMES DAILY AS NEEDED       divalproex 250 MG EC tablet    DEPAKOTE     Take 250 mg by mouth 3 times daily.       ESTRADIOL 0.1 MG/GM NON-ALCOHOL GEL      Insert 1 gram vaginally 2-3 times per week       FLUoxetine 20 MG capsule    PROzac     Take 40 mg by mouth At Bedtime       fluticasone-salmeterol 250-50 MCG/DOSE diskus inhaler    ADVAIR    60 Inhaler    Inhale 1 puff into the lungs 2 times daily       guaiFENesin 600 MG 12 hr tablet    MUCINEX     Take 300 mg by mouth 4 times daily.       HYDROcodone-acetaminophen 5-325 MG per tablet    NORCO    6 tablet    Take 1 tablet by mouth every 6 hours as needed for moderate to severe pain       ipratropium 17 MCG/ACT Inhaler    ATROVENT HFA    1 Inhaler    Inhale 2 puffs into the lungs every 6 hours       LAMOTRIGINE PO      Take 250 mg by mouth 3 times daily 100mg in am, 50mg mid day, 100mg in pm       levalbuterol 45 MCG/ACT Inhaler    XOPENEX HFA    1 Inhaler    Inhale 1 puff into the lungs every 6 hours as needed for shortness of breath / dyspnea or wheezing       levothyroxine 100 MCG tablet    SYNTHROID/LEVOTHROID     Take 100 mcg by mouth daily.       Multi-vitamin Tabs tablet      Take 1 tablet by mouth daily.       NAPROXEN PO      Take 250 mg by mouth as needed for moderate pain       omeprazole 20 MG CR capsule    priLOSEC    60 capsule     TAKE 1 CAPSULE (20 MG) BY MOUTH 2 TIMES DAILY       triamcinolone 55 MCG/ACT nasal inhaler    NASACORT     Spray 2 sprays into both nostrils as needed

## 2017-04-06 NOTE — PROGRESS NOTES
Infusion Nursing Note:  Tamra Aponte presents today for IVIG.    Patient seen by provider today: No   present during visit today: Not Applicable.    Note: N/A.    Intravenous Access:  Peripheral IV placed.    Treatment Conditions:        Post Infusion Assessment:  Patient tolerated infusion without incident.  Site patent and intact, free from redness, edema or discomfort.  No evidence of extravasations.  Access discontinued per protocol.    Discharge Plan:   Copy of AVS reviewed with patient and/or family.  Patient will return next month for next appointment. Pt and  will stop at 's desk to make futures appts.  Patient discharged in stable condition accompanied by: .  Departure Mode: Wheelchair.    Param Dewey RN

## 2017-04-06 NOTE — MR AVS SNAPSHOT
After Visit Summary   4/6/2017    Tamra Aponte    MRN: [id number]           Patient Information     Date Of Birth          1955        Visit Information        Provider Department      4/6/2017 1:00 PM  INFUSION CHAIR 6 [facility]        Today's Diagnoses     Hypogammaglobulinemia (H)    -  1    Bronchiectasis without complication (H)           Follow-ups after your visit        Your next 10 appointments already scheduled     May 05, 2017  1:00 PM CDT   Level 4 with  INFUSION CHAIR 18   [facility] ([facility])    [facility]  [address]  [facility] [address] [phone]            Aug 02, 2017 12:00 PM CDT   (Arrive by 11:45 AM)   Return Lung Transplant with Brayden Avendaño MD   [facility] ([facility] [place])    [address] [address] [phone]              Who to contact     If you have questions or need follow up information about today's clinic visit or your schedule please contact [facility] AND [facility] directly at [phone].  Normal or non-critical lab and imaging results will be communicated to you by MyChart, letter or phone within 4 business days after the clinic has received the results. If you do not hear from us within 7 days, please contact the clinic through Venturockethart or phone. If you have a critical or abnormal lab result, we will notify you by phone as soon as possible.  Submit refill requests through [employer] or call your pharmacy and they will forward the refill request to us. Please allow 3 business days for your refill to be completed.          Additional Information About Your Visit        VenturocketharOddcast Information     [employer] lets you send messages to your doctor, view your test results, renew your prescriptions, schedule appointments and  "more. To sign up, go to [email or web address] . Click on \"Log in\" on the left side of the screen, which will take you to the Welcome page. Then click on \"Sign up Now\" on the right side of the page.     You will be asked to enter the access code listed below, as well as some personal information. Please follow the directions to create your username and password.     Your access code is: [email or web address]  Expires: 2017  4:39 PM     Your access code will  in 90 days. If you need help or a new code, please call your [place] clinic or [phone].        Care EveryWhere ID     This is your Care EveryWhere ID. This could be used by other organizations to access your [place] medical records  [id number]        Your Vitals Were     Temperature Respirations                97.6  F (36.4  C) (Oral) 16           Blood Pressure from Last 3 Encounters:   17 150/82   17 143/69   17 144/62    Weight from Last 3 Encounters:   17 61.2 kg (135 lb)   17 63.5 kg (140 lb)   17 63.5 kg (140 lb)              We Performed the Following     Treatment Conditions     Treatment Conditions        Primary Care Provider Office Phone # Fax #    Edith Wen [phone]       [facility] [address] [place] [address]        Thank you!     Thank you for choosing [facility] AND [facility]  for your care. Our goal is always to provide you with excellent care. Hearing back from our patients is one way we can continue to improve our services. Please take a few minutes to complete the written survey that you may receive in the mail after your visit with us. Thank you!             Your Updated Medication List - Protect others around you: Learn how to safely use, store and throw away your medicines at www.disposemymeds.org.          This list is accurate as of: 17  3:50 PM.  Always use your most recent med list.                   Brand Name Dispense " Instructions for use    ascorbic acid 500 MG Tabs      Take 500 mg by mouth daily.       ATIVAN 0.5 MG tablet   Generic drug:  LORazepam      Take 0.5 mg by mouth every 6 hours as needed       azithromycin 250 MG tablet    ZITHROMAX    90 tablet    Take 1 tablet (250 mg) by mouth daily       calcitonin (salmon) 200 UNIT/ACT nasal spray    MIACALCIN     Spray 1 spray into one nostril alternating nostrils daily Alternate nostril each day.       calcium carbonate 500 MG tablet    OS-YULIET 500 mg [place]. Ca     Take 1,000 mg by mouth daily       ciprofloxacin 500 MG tablet    CIPRO    20 tablet    TAKE 1 TABLET (500 MG) BY MOUTH 2 TIMES DAILY AS NEEDED       divalproex 250 MG EC tablet    DEPAKOTE     Take 250 mg by mouth 3 times daily.       ESTRADIOL 0.1 MG/GM NON-ALCOHOL GEL      Insert 1 gram vaginally 2-3 times per week       FLUoxetine 20 MG capsule    PROzac     Take 40 mg by mouth At Bedtime       fluticasone-salmeterol 250-50 MCG/DOSE diskus inhaler    ADVAIR    60 Inhaler    Inhale 1 puff into the lungs 2 times daily       guaiFENesin 600 MG 12 hr tablet    MUCINEX     Take 300 mg by mouth 4 times daily.       HYDROcodone-acetaminophen 5-325 MG per tablet    NORCO    6 tablet    Take 1 tablet by mouth every 6 hours as needed for moderate to severe pain       ipratropium 17 MCG/ACT Inhaler    ATROVENT HFA    1 Inhaler    Inhale 2 puffs into the lungs every 6 hours       LAMOTRIGINE PO      Take 250 mg by mouth 3 times daily 100mg in am, 50mg mid day, 100mg in pm       levalbuterol 45 MCG/ACT Inhaler    XOPENEX HFA    1 Inhaler    Inhale 1 puff into the lungs every 6 hours as needed for shortness of breath / dyspnea or wheezing       levothyroxine 100 MCG tablet    SYNTHROID/LEVOTHROID     Take 100 mcg by mouth daily.       Multi-vitamin Tabs tablet      Take 1 tablet by mouth daily.       NAPROXEN PO      Take 250 mg by mouth as needed for moderate pain       omeprazole 20 MG CR capsule    priLOSEC    60 capsule     TAKE 1 CAPSULE (20 MG) BY MOUTH 2 TIMES DAILY       triamcinolone 55 MCG/ACT nasal inhaler    NASACORT     Spray 2 sprays into both nostrils as needed

## 2017-05-05 NOTE — MR AVS SNAPSHOT
"              After Visit Summary   5/5/2017    Tamra Aponte    MRN: [id number]           Patient Information     Date Of Birth          1955        Visit Information        Provider Department      5/5/2017 1:00 PM  INFUSION CHAIR 18 [facility]        Today's Diagnoses     Hypogammaglobulinemia (H)    -  1    Bronchiectasis without complication (H)           Follow-ups after your visit        Your next 10 appointments already scheduled     Aug 02, 2017 12:00 PM CDT   (Arrive by 11:45 AM)   Return Lung Transplant with Brayden Avendaño MD   [facility] ([facility])    [address]  3rd Floor  [facility] [address] [phone]              Who to contact     If you have questions or need follow up information about today's clinic visit or your schedule please contact [facility] AND [facility] directly at [phone].  Normal or non-critical lab and imaging results will be communicated to you by [employer]pl[employer]hart, letter or phone within 4 business days after the clinic has received the results. If you do not hear from us within 7 days, please contact the clinic through [employer]hart or phone. If you have a critical or abnormal lab result, we will notify you by phone as soon as possible.  Submit refill requests through [employer] or call your pharmacy and they will forward the refill request to us. Please allow 3 business days for your refill to be completed.          Additional Information About Your Visit        [employer] Information     [employer] lets you send messages to your doctor, view your test results, renew your prescriptions, schedule appointments and more. To sign up, go to [email or web address] . Click on \"Log in\" on the left side of the screen, which will take you to the Welcome page. Then click on \"Sign up Now\" on the right side of the page.     You will be asked to enter the access code " listed below, as well as some personal information. Please follow the directions to create your username and password.     Your access code is: [email or web address]  Expires: 8/3/2017  2:52 PM     Your access code will  in 90 days. If you need help or a new code, please call your [facility] or [phone].        Care EveryWhere ID     This is your Care EveryWhere ID. This could be used by other organizations to access your [place] medical records  [id number]        Your Vitals Were     Pulse Temperature Respirations             70 98.3  F (36.8  C) (Oral) 16          Blood Pressure from Last 3 Encounters:   17 150/74   17 150/82   17 143/69    Weight from Last 3 Encounters:   17 61.2 kg (135 lb)   17 63.5 kg (140 lb)   17 63.5 kg (140 lb)              We Performed the Following     Treatment Conditions     Treatment Conditions        Primary Care Provider Office Phone # Fax #    Edith CARLSON Behzad [phone]       [facility] [address] [address]        Thank you!     Thank you for choosing [facility] AND [facility]  for your care. Our goal is always to provide you with excellent care. Hearing back from our patients is one way we can continue to improve our services. Please take a few minutes to complete the written survey that you may receive in the mail after your visit with us. Thank you!             Your Updated Medication List - Protect others around you: Learn how to safely use, store and throw away your medicines at www.disposemymeds.org.          This list is accurate as of: 17  2:52 PM.  Always use your most recent med list.                   Brand Name Dispense Instructions for use    ascorbic acid 500 MG Tabs      Take 500 mg by mouth daily.       ATIVAN 0.5 MG tablet   Generic drug:  LORazepam      Take 0.5 mg by mouth every 6 hours as needed       azithromycin 250 MG tablet    ZITHROMAX    90 tablet    Take  1 tablet (250 mg) by mouth daily       calcitonin (salmon) 200 UNIT/ACT nasal spray    MIACALCIN     Spray 1 spray into one nostril alternating nostrils daily Alternate nostril each day.       calcium carbonate 500 MG tablet    OS-YULIET 500 mg [place]. Ca     Take 1,000 mg by mouth daily       ciprofloxacin 500 MG tablet    CIPRO    20 tablet    TAKE 1 TABLET (500 MG) BY MOUTH 2 TIMES DAILY AS NEEDED       divalproex 250 MG EC tablet    DEPAKOTE     Take 250 mg by mouth 3 times daily.       ESTRADIOL 0.1 MG/GM NON-ALCOHOL GEL      Insert 1 gram vaginally 2-3 times per week       FLUoxetine 20 MG capsule    PROzac     Take 40 mg by mouth At Bedtime       fluticasone-salmeterol 250-50 MCG/DOSE diskus inhaler    ADVAIR    60 Inhaler    Inhale 1 puff into the lungs 2 times daily       guaiFENesin 600 MG 12 hr tablet    MUCINEX     Take 300 mg by mouth 4 times daily.       HYDROcodone-acetaminophen 5-325 MG per tablet    NORCO    6 tablet    Take 1 tablet by mouth every 6 hours as needed for moderate to severe pain       ipratropium 17 MCG/ACT Inhaler    ATROVENT HFA    1 Inhaler    Inhale 2 puffs into the lungs every 6 hours       LAMOTRIGINE PO      Take 250 mg by mouth 3 times daily 100mg in am, 50mg mid day, 100mg in pm       levalbuterol 45 MCG/ACT Inhaler    XOPENEX HFA    1 Inhaler    Inhale 1 puff into the lungs every 6 hours as needed for shortness of breath / dyspnea or wheezing       levothyroxine 100 MCG tablet    SYNTHROID/LEVOTHROID     Take 100 mcg by mouth daily.       Multi-vitamin Tabs tablet      Take 1 tablet by mouth daily.       NAPROXEN PO      Take 250 mg by mouth as needed for moderate pain       omeprazole 20 MG CR capsule    priLOSEC    60 capsule    TAKE 1 CAPSULE (20 MG) BY MOUTH 2 TIMES DAILY       triamcinolone 55 MCG/ACT nasal inhaler    NASACORT     Spray 2 sprays into both nostrils as needed

## 2017-05-05 NOTE — PROGRESS NOTES
Infusion Nursing Note:  Tamra Aponte presents today for IVIG.    Patient seen by provider today: No   present during visit today: Not Applicable.    Note: N/A.    Intravenous Access:  Peripheral IV placed.    Treatment Conditions:  Not Applicable.      Post Infusion Assessment:  Patient tolerated infusion without incident.  Blood return noted pre and post infusion.  Site patent and intact, free from redness, edema or discomfort.  No evidence of extravasations.  Access discontinued per protocol.    Discharge Plan:   Patient declined prescription refills.  Discharge instructions reviewed with: Patient.  Patient and/or family verbalized understanding of discharge instructions and all questions answered.  Copy of AVS reviewed with patient and/or family.  Patient will return prn for next appointment.  Patient discharged in stable condition accompanied by: self and .  Departure Mode: Wheelchair.    Michelle Raines RN

## 2017-06-02 NOTE — MR AVS SNAPSHOT
After Visit Summary   6/2/2017    Tamra Aponte    MRN: [id number]           Patient Information     Date Of Birth          1955        Visit Information        Provider Department      6/2/2017 1:00 PM  INFUSION CHAIR 3 [facility]        Today's Diagnoses     Hypogammaglobulinemia (H)    -  1    Bronchiectasis without complication (H)           Follow-ups after your visit        Your next 10 appointments already scheduled     Jun 30, 2017 12:30 PM CDT   Level 4 with  INFUSION CHAIR 15   [facility] ([facility])    [facility]  [address]  [facility] [address] [phone]            Aug 02, 2017 12:00 PM CDT   (Arrive by 11:45 AM)   Return Lung Transplant with Brayden Avendaño MD   [facility] ([facility] [place])    [address] [address] [phone]              Who to contact     If you have questions or need follow up information about today's clinic visit or your schedule please contact [facility] directly at [phone].  Normal or non-critical lab and imaging results will be communicated to you by MyChart, letter or phone within 4 business days after the clinic has received the results. If you do not hear from us within 7 days, please contact the clinic through [employer]finder Technologieshart or phone. If you have a critical or abnormal lab result, we will notify you by phone as soon as possible.  Submit refill requests through [employer] or call your pharmacy and they will forward the refill request to us. Please allow 3 business days for your refill to be completed.          Additional Information About Your Visit        Pathfinder [email or web address] Information     [employer] lets you send messages to your doctor, view your test results, renew your prescriptions, schedule appointments and  "more. To sign up, go to [email or web address] . Click on \"Log in\" on the left side of the screen, which will take you to the Welcome page. Then click on \"Sign up Now\" on the right side of the page.     You will be asked to enter the access code listed below, as well as some personal information. Please follow the directions to create your username and password.     Your access code is: [email or web address]  Expires: 8/3/2017  2:52 PM     Your access code will  in 90 days. If you need help or a new code, please call your [place] clinic or [phone].        Care EveryWhere ID     This is your Care EveryWhere ID. This could be used by other organizations to access your [place] medical records  [id number]        Your Vitals Were     Pulse Temperature Respirations             67 97.5  F (36.4  C) (Oral) 20          Blood Pressure from Last 3 Encounters:   17 129/68   17 150/74   17 150/82    Weight from Last 3 Encounters:   17 61.2 kg (135 lb)   17 63.5 kg (140 lb)   17 63.5 kg (140 lb)              Today, you had the following     No orders found for display       Primary Care Provider Office Phone # Fax #    Edith Wen [phone]       [place] [address]        Thank you!     Thank you for choosing [facility]  for your care. Our goal is always to provide you with excellent care. Hearing back from our patients is one way we can continue to improve our services. Please take a few minutes to complete the written survey that you may receive in the mail after your visit with us. Thank you!             Your Updated Medication List - Protect others around you: Learn how to safely use, store and throw away your medicines at www.disposemymeds.org.          This list is accurate as of: 17  3:25 PM.  Always use your most recent med list.                   Brand Name Dispense Instructions for use    " ascorbic acid 500 MG Tabs      Take 500 mg by mouth daily.       ATIVAN 0.5 MG tablet   Generic drug:  LORazepam      Take 0.5 mg by mouth every 6 hours as needed       azithromycin 250 MG tablet    ZITHROMAX    90 tablet    Take 1 tablet (250 mg) by mouth daily       calcitonin (salmon) 200 UNIT/ACT nasal spray    MIACALCIN     Spray 1 spray into one nostril alternating nostrils daily Alternate nostril each day.       calcium carbonate 1250 MG tablet    OS-YULIET 500 mg [place]. Ca     Take 1,000 mg by mouth daily       ciprofloxacin 500 MG tablet    CIPRO    20 tablet    TAKE 1 TABLET (500 MG) BY MOUTH 2 TIMES DAILY AS NEEDED       divalproex 250 MG EC tablet    DEPAKOTE     Take 250 mg by mouth 3 times daily.       ESTRADIOL 0.1 MG/GM NON-ALCOHOL GEL      Insert 1 gram vaginally 2-3 times per week       FLUoxetine 20 MG capsule    PROzac     Take 40 mg by mouth At Bedtime       fluticasone-salmeterol 250-50 MCG/DOSE diskus inhaler    ADVAIR    60 Inhaler    Inhale 1 puff into the lungs 2 times daily       guaiFENesin 600 MG 12 hr tablet    MUCINEX     Take 300 mg by mouth 4 times daily.       HYDROcodone-acetaminophen 5-325 MG per tablet    NORCO    6 tablet    Take 1 tablet by mouth every 6 hours as needed for moderate to severe pain       ipratropium 17 MCG/ACT Inhaler    ATROVENT HFA    1 Inhaler    Inhale 2 puffs into the lungs every 6 hours       LAMOTRIGINE PO      Take 250 mg by mouth 3 times daily 100mg in am, 50mg mid day, 100mg in pm       levalbuterol 45 MCG/ACT Inhaler    XOPENEX HFA    1 Inhaler    Inhale 1 puff into the lungs every 6 hours as needed for shortness of breath / dyspnea or wheezing       levothyroxine 100 MCG tablet    SYNTHROID/LEVOTHROID     Take 100 mcg by mouth daily.       Multi-vitamin Tabs tablet      Take 1 tablet by mouth daily.       NAPROXEN PO      Take 250 mg by mouth as needed for moderate pain       omeprazole 20 MG CR capsule    priLOSEC    60 capsule    TAKE 1 CAPSULE (20 MG)  BY MOUTH 2 TIMES DAILY       triamcinolone 55 MCG/ACT nasal inhaler    NASACORT     Spray 2 sprays into both nostrils as needed

## 2017-06-02 NOTE — PROGRESS NOTES
Infusion Nursing Note:  Tamra Aponte presents today for IVIG.    Patient seen by provider today: No   present during visit today: Not Applicable.    Note: N/A.    Intravenous Access:  Peripheral IV placed.    Treatment Conditions:  Not Applicable.      Post Infusion Assessment:  Patient tolerated infusion without incident.  Site patent and intact, free from redness, edema or discomfort.  No evidence of extravasations.  Access discontinued per protocol.    Discharge Plan:   Discharge instructions reviewed with: Patient and Family.  Patient and/or family verbalized understanding of discharge instructions and all questions answered.  Copy of AVS reviewed with patient and/or family.  Patient will return 4 wks for next appointment.  Patient discharged in stable condition accompanied by: .  Departure Mode: Wheelchair.    VERONICA Duarte RN

## 2017-07-02 NOTE — ED AVS SNAPSHOT
Emergency Department    [address] [place] [address]    Phone:  [phone]    Fax:  [phone]                                       Tamra Aponte   MRN: [id number]    Department:   Emergency Department   Date of Visit:  7/2/2017           After Visit Summary Signature Page     I have received my discharge instructions, and my questions have been answered. I have discussed any challenges I see with this plan with the nurse or doctor.    ..........................................................................................................................................  Patient/Patient Representative Signature      ..........................................................................................................................................  Patient Representative Print Name and Relationship to Patient    ..................................................               ................................................  Date                                            Time    ..........................................................................................................................................  Reviewed by Signature/Title    ...................................................              ..............................................  Date                                                            Time
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
## 2017-07-02 NOTE — ED AVS SNAPSHOT
Emergency Department    [address] [facility] [address]    Phone:  [phone]    Fax:  [phone]                                       Tamra Aponte   MRN: [id number]    Department:   Emergency Department   Date of Visit:  7/2/2017           Patient Information     Date Of Birth          1955        Your diagnoses for this visit were:     Fall, initial encounter        You were seen by Rodolfo Castillo MD.      Follow-up Information     Follow up with  Emergency Department.    Specialty:  EMERGENCY MEDICINE    Why:  As needed    Contact information:    [address] [facility] [address] [phone]      Discharge References/Attachments     FALLS, WHAT TO DO WHEN A PATIENT, STAFF ED (ENGLISH)      Future Appointments        Provider Department Dept Phone Center    8/2/2017 12:00 PM Brayden Avendaño MD [facility] [phone] [facility]      24 Hour Appointment Hotline       To make an appointment at any [facility], call [phone] ([phone]). If you don't have a family doctor or clinic, we will help you find one. [facility] clinics are conveniently located to serve the needs of you and your family.             Review of your medicines      Our records show that you are taking the medicines listed below. If these are incorrect, please call your family doctor or clinic.        Dose / Directions Last dose taken    ascorbic acid 500 MG Tabs   Dose:  500 mg        Take 500 mg by mouth daily.   Refills:  0        ATIVAN 0.5 MG tablet   Dose:  0.5 mg   Generic drug:  LORazepam        Take 0.5 mg by mouth every 6 hours as needed   Refills:  0        azithromycin 250 MG tablet   Commonly known as:  ZITHROMAX   Dose:  250 mg   Quantity:  90 tablet        Take 1 tablet (250 mg) by mouth daily   Refills:  3        calcitonin (salmon) 200 UNIT/ACT nasal spray   Commonly known as:  MIACALCIN   Dose:  1 spray        Spray 1 spray  into one nostril alternating nostrils daily Alternate nostril each day.   Refills:  0        calcium carbonate 1250 MG tablet   Commonly known as:  OS-YULIET 500 mg [place]   Dose:  1000 mg        Take 1,000 mg by mouth daily   Refills:  0        ciprofloxacin 500 MG tablet   Commonly known as:  CIPRO   Quantity:  20 tablet        TAKE 1 TABLET (500 MG) BY MOUTH 2 TIMES DAILY AS NEEDED   Refills:  3        divalproex 250 MG EC tablet   Commonly known as:  DEPAKOTE   Dose:  250 mg        Take 250 mg by mouth 3 times daily.   Refills:  0        ESTRADIOL 0.1 MG/GM NON-ALCOHOL GEL        Insert 1 gram vaginally 2-3 times per week   Refills:  0        FLUoxetine 20 MG capsule   Commonly known as:  PROzac   Dose:  40 mg        Take 40 mg by mouth At Bedtime   Refills:  0        fluticasone-salmeterol 250-50 MCG/DOSE diskus inhaler   Commonly known as:  ADVAIR   Dose:  1 puff   Quantity:  60 Inhaler        Inhale 1 puff into the lungs 2 times daily   Refills:  3        guaiFENesin 600 MG 12 hr tablet   Commonly known as:  MUCINEX   Dose:  300 mg        Take 300 mg by mouth 4 times daily.   Refills:  0        HYDROcodone-acetaminophen 5-325 MG per tablet   Commonly known as:  NORCO   Dose:  1 tablet   Quantity:  6 tablet        Take 1 tablet by mouth every 6 hours as needed for moderate to severe pain   Refills:  0        ipratropium 17 MCG/ACT Inhaler   Commonly known as:  ATROVENT HFA   Dose:  2 puff   Quantity:  1 Inhaler        Inhale 2 puffs into the lungs every 6 hours   Refills:  3        LAMOTRIGINE PO   Dose:  250 mg        Take 250 mg by mouth 3 times daily 100mg in am, 50mg mid day, 100mg in pm   Refills:  0        levalbuterol 45 MCG/ACT Inhaler   Commonly known as:  XOPENEX HFA   Dose:  1 puff   Quantity:  1 Inhaler        Inhale 1 puff into the lungs every 6 hours as needed for shortness of breath / dyspnea or wheezing   Refills:  11        levothyroxine 100 MCG tablet   Commonly known as:  SYNTHROID/LEVOTHROID    Dose:  100 mcg        Take 100 mcg by mouth daily.   Refills:  0        Multi-vitamin Tabs tablet   Dose:  1 tablet        Take 1 tablet by mouth daily.   Refills:  0        NAPROXEN PO   Dose:  250 mg        Take 250 mg by mouth as needed for moderate pain   Refills:  0        omeprazole 20 MG CR capsule   Commonly known as:  priLOSEC   Quantity:  60 capsule        TAKE 1 CAPSULE (20 MG) BY MOUTH 2 TIMES DAILY   Refills:  9        triamcinolone 55 MCG/ACT nasal inhaler   Commonly known as:  NASACORT   Dose:  2 spray        Spray 2 sprays into both nostrils as needed   Refills:  0                Orders Needing Specimen Collection     None      Pending Results     No orders found from 6/30/2017 to 7/3/2017.            Pending Culture Results     No orders found from 6/30/2017 to 7/3/2017.            Pending Results Instructions     If you had any lab results that were not finalized at the time of your Discharge, you can call the ED Lab Result RN at [phone]. You will be contacted by this team for any positive Lab results or changes in treatment. The nurses are available 7 days a week from 10A to 6:30P.  You can leave a message 24 hours per day and they will return your call.        Test Results From Your Hospital Stay               Clinical Quality Measure: Blood Pressure Screening     Your blood pressure was checked while you were in the emergency department today. The last reading we obtained was  BP: 122/60 . Please read the guidelines below about what these numbers mean and what you should do about them.  If your systolic blood pressure (the top number) is less than 120 and your diastolic blood pressure (the bottom number) is less than 80, then your blood pressure is normal. There is nothing more that you need to do about it.  If your systolic blood pressure (the top number) is 120-139 or your diastolic blood pressure (the bottom number) is 80-89, your blood pressure may be higher than it should be. You  "should have your blood pressure rechecked within a year by a primary care provider.  If your systolic blood pressure (the top number) is 140 or greater or your diastolic blood pressure (the bottom number) is 90 or greater, you may have high blood pressure. High blood pressure is treatable, but if left untreated over time it can put you at risk for heart attack, stroke, or kidney failure. You should have your blood pressure rechecked by a primary care provider within the next 4 weeks.  If your provider in the emergency department today gave you specific instructions to follow-up with your doctor or provider even sooner than that, you should follow that instruction and not wait for up to 4 weeks for your follow-up visit.        Thank you for choosing [facility]       Thank you for choosing [facility] for your care. Our goal is always to provide you with excellent care. Hearing back from our patients is one way we can continue to improve our services. Please take a few minutes to complete the written survey that you may receive in the mail after you visit with us. Thank you!        [facility] Information     [facility] lets you send messages to your doctor, view your test results, renew your prescriptions, schedule appointments and more. To sign up, go to [email or web address] . Click on \"Log in\" on the left side of the screen, which will take you to the Welcome page. Then click on \"Sign up Now\" on the right side of the page.     You will be asked to enter the access code listed below, as well as some personal information. Please follow the directions to create your username and password.     Your access code is: [email or web address]  Expires: 8/3/2017  2:52 PM     Your access code will  in 90 days. If you need help or a new code, please call your [facility] clinic or [phone].        Care EveryWhere ID     This is your Care EveryWhere ID. This could be used by other organizations to access your [facility] medical " records  [id number]        Equal Access to Services     JULIO CÉSAR ALONZO : Terrance Busch, lonny peacock, manoj galdamez. So [facility] [phone].    ATENCIÓN: Si habla español, tiene a archer disposición servicios gratuitos de asistencia lingüística. Llame al [phone].    We comply with applicable federal civil rights laws and [place] laws. We do not discriminate on the basis of race, color, national origin, age, disability sex, sexual orientation or gender identity.            After Visit Summary       This is your record. Keep this with you and show to your community pharmacist(s) and doctor(s) at your next visit.

## 2017-07-25 NOTE — TELEPHONE ENCOUNTER
Contacted by Tamra's PCP, Edith Contreras, to let us know that Tamra has experienced neurological decline over the last 4 - 6 months and will be entering a memory care unit.

## 2017-08-01 ENCOUNTER — TELEPHONE (OUTPATIENT)
Dept: PULMONOLOGY | Facility: CLINIC | Age: 62
End: 2017-08-01

## 2017-08-01 NOTE — TELEPHONE ENCOUNTER
Contacted by patient's  on 7/27/17 to report that they are attempting to get patient into memory care for neurologic decline. Cancelled apt with Dr Avendaño. Will update clinic when they find placement.